# Patient Record
(demographics unavailable — no encounter records)

---

## 2025-02-03 NOTE — END OF VISIT
[1. Privacy issue, precluded by [facility] or Federal Law] : Reason - Privacy issue, precluded by [facility] or Federal Law

## 2025-02-03 NOTE — BEHAVIORAL HEALTH
[Privacy Issue - precluded by [place] or Federal Law] : Privacy Issue - precluded by [place] or Federal Law [[email or web address]] : 47216 (1) 9:00am to 9:30am, 30 minutes  46891 (1) 9:30am to 10:00am, 30 minutes Patient seen for neuropsychological testing and was attentive and cooperative throughout the assessment.  Behavior and interactions were WNL.  The following areas were assessed: verbal comprehension and reasoning, perceptual reasoning, working memory and processing speed. Plan to continue testing to assess visual attention, auditory attention, executive functioning, memory, and language as well as emotional functioning.